# Patient Record
Sex: MALE | Race: WHITE | NOT HISPANIC OR LATINO | Employment: OTHER | URBAN - METROPOLITAN AREA
[De-identification: names, ages, dates, MRNs, and addresses within clinical notes are randomized per-mention and may not be internally consistent; named-entity substitution may affect disease eponyms.]

---

## 2022-05-31 ENCOUNTER — NEW PATIENT (OUTPATIENT)
Dept: URBAN - METROPOLITAN AREA CLINIC 32 | Facility: CLINIC | Age: 77
End: 2022-05-31

## 2022-05-31 DIAGNOSIS — H40.013: ICD-10-CM

## 2022-05-31 PROCEDURE — 92004 COMPRE OPH EXAM NEW PT 1/>: CPT

## 2022-05-31 PROCEDURE — 92133 CPTRZD OPH DX IMG PST SGM ON: CPT

## 2022-05-31 ASSESSMENT — KERATOMETRY
OS_K2POWER_DIOPTERS: 39.75
OD_AXISANGLE2_DEGREES: 85
OD_K2POWER_DIOPTERS: 40.50
OS_AXISANGLE2_DEGREES: 80
OD_K1POWER_DIOPTERS: 41.00
OS_K1POWER_DIOPTERS: 40.25
OS_AXISANGLE_DEGREES: 170
OD_AXISANGLE_DEGREES: 175

## 2022-05-31 ASSESSMENT — VISUAL ACUITY
OS_SC: 20/40+2
OD_PH: 20/30-2
OD_SC: J3
OS_CC: J2
OS_PH: 20/25+1
OD_SC: 20/50-1
OS_SC: J10-1
OD_CC: J3

## 2022-05-31 ASSESSMENT — TONOMETRY
OD_IOP_MMHG: 14
OS_IOP_MMHG: 11

## 2023-01-26 ENCOUNTER — EMERGENCY VISIT (OUTPATIENT)
Dept: URBAN - METROPOLITAN AREA CLINIC 32 | Facility: CLINIC | Age: 78
End: 2023-01-26

## 2023-01-26 DIAGNOSIS — H16.223: ICD-10-CM

## 2023-01-26 DIAGNOSIS — H40.013: ICD-10-CM

## 2023-01-26 DIAGNOSIS — H26.493: ICD-10-CM

## 2023-01-26 DIAGNOSIS — H35.373: ICD-10-CM

## 2023-01-26 PROCEDURE — 92250 FUNDUS PHOTOGRAPHY W/I&R: CPT

## 2023-01-26 PROCEDURE — 92134 CPTRZ OPH DX IMG PST SGM RTA: CPT

## 2023-01-26 PROCEDURE — 92014 COMPRE OPH EXAM EST PT 1/>: CPT

## 2023-01-26 PROCEDURE — 92015 DETERMINE REFRACTIVE STATE: CPT

## 2023-01-26 ASSESSMENT — KERATOMETRY
OD_AXISANGLE2_DEGREES: 91
OS_AXISANGLE2_DEGREES: 80
OD_K1POWER_DIOPTERS: 40.50
OS_K1POWER_DIOPTERS: 40.50
OD_K1POWER_DIOPTERS: 41.00
OS_K2POWER_DIOPTERS: 39.75
OS_AXISANGLE2_DEGREES: 87
OD_K2POWER_DIOPTERS: 40.50
OD_AXISANGLE_DEGREES: 1
OD_AXISANGLE2_DEGREES: 85
OS_K1POWER_DIOPTERS: 40.25
OS_AXISANGLE_DEGREES: 177
OD_AXISANGLE_DEGREES: 175
OS_AXISANGLE_DEGREES: 170
OD_K2POWER_DIOPTERS: 39.75

## 2023-01-26 ASSESSMENT — VISUAL ACUITY
OS_SC: J3
OD_SC: J3
OS_GLARE: 20/100
OS_SC: 20/70
OS_PH: 20/60-1
OD_GLARE: 20/100
OD_SC: 20/80
OD_PH: 20/60

## 2023-01-26 ASSESSMENT — TONOMETRY
OS_IOP_MMHG: 12
OD_IOP_MMHG: 15

## 2024-03-07 ENCOUNTER — COMPREHENSIVE EXAM (OUTPATIENT)
Dept: URBAN - METROPOLITAN AREA CLINIC 32 | Facility: CLINIC | Age: 79
End: 2024-03-07

## 2024-03-07 DIAGNOSIS — H40.013: ICD-10-CM

## 2024-03-07 DIAGNOSIS — H16.223: ICD-10-CM

## 2024-03-07 DIAGNOSIS — H35.373: ICD-10-CM

## 2024-03-07 DIAGNOSIS — H35.033: ICD-10-CM

## 2024-03-07 PROCEDURE — 99214 OFFICE O/P EST MOD 30 MIN: CPT

## 2024-03-07 PROCEDURE — 92015 DETERMINE REFRACTIVE STATE: CPT

## 2024-03-07 PROCEDURE — 92133 CPTRZD OPH DX IMG PST SGM ON: CPT

## 2024-03-07 ASSESSMENT — KERATOMETRY
OS_K2POWER_DIOPTERS: 39.75
OS_K1POWER_DIOPTERS: 40.25
OD_K1POWER_DIOPTERS: 40.50
OS_AXISANGLE2_DEGREES: 87
OS_AXISANGLE2_DEGREES: 80
OD_AXISANGLE_DEGREES: 1
OS_K1POWER_DIOPTERS: 40.50
OD_K2POWER_DIOPTERS: 39.75
OD_K2POWER_DIOPTERS: 40.50
OD_AXISANGLE_DEGREES: 175
OS_AXISANGLE_DEGREES: 170
OD_K1POWER_DIOPTERS: 41.00
OD_AXISANGLE2_DEGREES: 91
OD_AXISANGLE2_DEGREES: 85
OS_AXISANGLE_DEGREES: 177

## 2024-03-07 ASSESSMENT — TONOMETRY
OS_IOP_MMHG: 16
OD_IOP_MMHG: 16

## 2024-03-07 ASSESSMENT — VISUAL ACUITY
OS_SC: 20/70
OD_SC: 20/50

## 2025-03-11 ENCOUNTER — COMPREHENSIVE EXAM (OUTPATIENT)
Age: 80
End: 2025-03-11

## 2025-03-11 DIAGNOSIS — H16.223: ICD-10-CM

## 2025-03-11 DIAGNOSIS — H35.373: ICD-10-CM

## 2025-03-11 DIAGNOSIS — H35.033: ICD-10-CM

## 2025-03-11 DIAGNOSIS — H40.013: ICD-10-CM

## 2025-03-11 DIAGNOSIS — Z96.1: ICD-10-CM

## 2025-03-11 PROCEDURE — 92015 DETERMINE REFRACTIVE STATE: CPT

## 2025-03-11 PROCEDURE — 99214 OFFICE O/P EST MOD 30 MIN: CPT

## 2025-03-11 PROCEDURE — 92133 CPTRZD OPH DX IMG PST SGM ON: CPT

## 2025-03-21 ENCOUNTER — APPOINTMENT (OUTPATIENT)
Dept: URBAN - METROPOLITAN AREA CLINIC 126 | Facility: CLINIC | Age: 80
Setting detail: DERMATOLOGY
End: 2025-03-21

## 2025-03-21 DIAGNOSIS — L85.3 XEROSIS CUTIS: ICD-10-CM

## 2025-03-21 DIAGNOSIS — Z71.89 OTHER SPECIFIED COUNSELING: ICD-10-CM

## 2025-03-21 DIAGNOSIS — L81.4 OTHER MELANIN HYPERPIGMENTATION: ICD-10-CM

## 2025-03-21 DIAGNOSIS — L73.9 FOLLICULAR DISORDER, UNSPECIFIED: ICD-10-CM | Status: INADEQUATELY CONTROLLED

## 2025-03-21 DIAGNOSIS — L82.1 OTHER SEBORRHEIC KERATOSIS: ICD-10-CM

## 2025-03-21 DIAGNOSIS — D18.0 HEMANGIOMA: ICD-10-CM

## 2025-03-21 DIAGNOSIS — D49.2 NEOPLASM OF UNSPECIFIED BEHAVIOR OF BONE, SOFT TISSUE, AND SKIN: ICD-10-CM

## 2025-03-21 PROBLEM — D18.01 HEMANGIOMA OF SKIN AND SUBCUTANEOUS TISSUE: Status: ACTIVE | Noted: 2025-03-21

## 2025-03-21 PROCEDURE — ? COUNSELING

## 2025-03-21 PROCEDURE — ? PRESCRIPTION

## 2025-03-21 PROCEDURE — 99204 OFFICE O/P NEW MOD 45 MIN: CPT | Mod: 25

## 2025-03-21 PROCEDURE — ? SUNSCREEN RECOMMENDATIONS

## 2025-03-21 PROCEDURE — ? BIOPSY BY SHAVE METHOD

## 2025-03-21 PROCEDURE — 11102 TANGNTL BX SKIN SINGLE LES: CPT

## 2025-03-21 RX ORDER — CLINDAMYCIN PHOSPHATE 10 MG/ML
LOTION TOPICAL
Qty: 60 | Refills: 3 | Status: ERX | COMMUNITY
Start: 2025-03-21

## 2025-03-21 RX ADMIN — CLINDAMYCIN PHOSPHATE: 10 LOTION TOPICAL at 00:00

## 2025-03-21 ASSESSMENT — LOCATION DETAILED DESCRIPTION DERM
LOCATION DETAILED: RIGHT SUPERIOR UPPER BACK
LOCATION DETAILED: RIGHT MID-UPPER BACK
LOCATION DETAILED: RIGHT PROXIMAL PRETIBIAL REGION
LOCATION DETAILED: LEFT LATERAL SUPERIOR CHEST
LOCATION DETAILED: RIGHT ANTERIOR PROXIMAL THIGH
LOCATION DETAILED: MID POSTERIOR NECK
LOCATION DETAILED: LEFT MEDIAL INFERIOR CHEST
LOCATION DETAILED: LEFT LATERAL ABDOMEN

## 2025-03-21 ASSESSMENT — LOCATION SIMPLE DESCRIPTION DERM
LOCATION SIMPLE: ABDOMEN
LOCATION SIMPLE: RIGHT PRETIBIAL REGION
LOCATION SIMPLE: CHEST
LOCATION SIMPLE: RIGHT UPPER BACK
LOCATION SIMPLE: RIGHT THIGH
LOCATION SIMPLE: POSTERIOR NECK

## 2025-03-21 ASSESSMENT — LOCATION ZONE DERM
LOCATION ZONE: NECK
LOCATION ZONE: TRUNK
LOCATION ZONE: LEG

## 2025-03-21 NOTE — PROCEDURE: BIOPSY BY SHAVE METHOD
Body Location Override (Optional - Billing Will Still Be Based On Selected Body Map Location If Applicable): right medial pretibial
Detail Level: Detailed
Depth Of Biopsy: dermis
Was A Bandage Applied: Yes
Size Of Lesion In Cm: 0
Biopsy Type: H and E
Biopsy Method: Personna blade
Anesthesia Type: 1% lidocaine with 1:200,000 epinephrine and a 1:10 solution of 8.4% sodium bicarbonate
Anesthesia Volume In Cc: 0.5
Hemostasis: Aluminum Chloride
Wound Care: Petrolatum
Dressing: bandage
Destruction After The Procedure: No
Type Of Destruction Used: Curettage
Curettage Text: The wound bed was treated with curettage after the biopsy was performed.
Cryotherapy Text: The wound bed was treated with cryotherapy after the biopsy was performed.
Electrodesiccation Text: The wound bed was treated with electrodesiccation after the biopsy was performed.
Electrodesiccation And Curettage Text: The wound bed was treated with electrodesiccation and curettage after the biopsy was performed.
Silver Nitrate Text: The wound bed was treated with silver nitrate after the biopsy was performed.
Lab: [id number]
Lab Facility: [id number]
Medical Necessity Information: It is in your best interest to select a reason for this procedure from the list below. All of these items fulfill various CMS LCD requirements except the new and changing color options.
Consent: Written consent was obtained and risks were reviewed including but not limited to scarring, infection, bleeding, scabbing, incomplete removal, nerve damage and allergy to anesthesia.
Post-Care Instructions: I reviewed with the patient in detail post-care instructions. Patient is to keep the biopsy site dry overnight, and then apply bacitracin twice daily until healed. Patient may apply hydrogen peroxide soaks to remove any crusting.
Notification Instructions: Patient will be notified of biopsy results. However, patient instructed to call the office if not contacted within 2 weeks.
Billing Type: Third-Party Bill
Information: Selecting Yes will display possible errors in your note based on the variables you have selected. This validation is only offered as a suggestion for you. PLEASE NOTE THAT THE VALIDATION TEXT WILL BE REMOVED WHEN YOU FINALIZE YOUR NOTE. IF YOU WANT TO FAX A PRELIMINARY NOTE YOU WILL NEED TO TOGGLE THIS TO 'NO' IF YOU DO NOT WANT IT IN YOUR FAXED NOTE.

## 2025-05-09 ENCOUNTER — APPOINTMENT (OUTPATIENT)
Dept: URBAN - METROPOLITAN AREA CLINIC 126 | Facility: CLINIC | Age: 80
Setting detail: DERMATOLOGY
End: 2025-05-09

## 2025-05-09 DIAGNOSIS — L57.0 ACTINIC KERATOSIS: ICD-10-CM

## 2025-05-09 PROCEDURE — ? LIQUID NITROGEN

## 2025-05-09 PROCEDURE — 17000 DESTRUCT PREMALG LESION: CPT

## 2025-05-09 ASSESSMENT — LOCATION SIMPLE DESCRIPTION DERM: LOCATION SIMPLE: RIGHT PRETIBIAL REGION

## 2025-05-09 ASSESSMENT — LOCATION DETAILED DESCRIPTION DERM: LOCATION DETAILED: RIGHT MEDIAL PROXIMAL PRETIBIAL REGION

## 2025-05-09 ASSESSMENT — LOCATION ZONE DERM: LOCATION ZONE: LEG

## 2025-05-09 NOTE — PROCEDURE: LIQUID NITROGEN
Show Aperture Variable?: Yes
Consent: The patient's consent was obtained including but not limited to risks of crusting, scabbing, blistering, scarring, darker or lighter pigmentary change, recurrence, incomplete removal and infection.
Application Tool (Optional): Liquid Nitrogen Sprayer
Render Note In Bullet Format When Appropriate: No
Number Of Freeze-Thaw Cycles: 3 freeze-thaw cycles
Post-Care Instructions: I reviewed with the patient in detail post-care instructions. Patient is to wear sunprotection, and avoid picking at any of the treated lesions. Pt may apply Vaseline to crusted or scabbing areas.
Duration Of Freeze Thaw-Cycle (Seconds): 0
Detail Level: Detailed